# Patient Record
Sex: MALE | Race: WHITE | NOT HISPANIC OR LATINO | Employment: FULL TIME | URBAN - METROPOLITAN AREA
[De-identification: names, ages, dates, MRNs, and addresses within clinical notes are randomized per-mention and may not be internally consistent; named-entity substitution may affect disease eponyms.]

---

## 2022-12-01 DIAGNOSIS — G40.909 NONINTRACTABLE EPILEPSY WITHOUT STATUS EPILEPTICUS, UNSPECIFIED EPILEPSY TYPE: Primary | ICD-10-CM

## 2023-12-18 DIAGNOSIS — R56.9 SEIZURE: Primary | ICD-10-CM

## 2024-03-19 ENCOUNTER — PATIENT MESSAGE (OUTPATIENT)
Dept: NEUROLOGY | Facility: CLINIC | Age: 39
End: 2024-03-19
Payer: COMMERCIAL

## 2024-03-22 ENCOUNTER — OFFICE VISIT (OUTPATIENT)
Dept: NEUROLOGY | Facility: CLINIC | Age: 39
End: 2024-03-22
Payer: COMMERCIAL

## 2024-03-22 VITALS
DIASTOLIC BLOOD PRESSURE: 82 MMHG | SYSTOLIC BLOOD PRESSURE: 130 MMHG | HEIGHT: 68 IN | BODY MASS INDEX: 28.04 KG/M2 | WEIGHT: 185 LBS

## 2024-03-22 DIAGNOSIS — G40.019 LOCALIZATION-RELATED (FOCAL) (PARTIAL) IDIOPATHIC EPILEPSY AND EPILEPTIC SYNDROMES WITH SEIZURES OF LOCALIZED ONSET, INTRACTABLE, WITHOUT STATUS EPILEPTICUS: Primary | ICD-10-CM

## 2024-03-22 DIAGNOSIS — R56.9 SEIZURE: ICD-10-CM

## 2024-03-22 PROCEDURE — 99203 OFFICE O/P NEW LOW 30 MIN: CPT | Mod: S$GLB,,, | Performed by: SPECIALIST

## 2024-03-22 PROCEDURE — 1159F MED LIST DOCD IN RCRD: CPT | Mod: CPTII,S$GLB,, | Performed by: SPECIALIST

## 2024-03-22 PROCEDURE — 3075F SYST BP GE 130 - 139MM HG: CPT | Mod: CPTII,S$GLB,, | Performed by: SPECIALIST

## 2024-03-22 PROCEDURE — 99999 PR PBB SHADOW E&M-EST. PATIENT-LVL III: CPT | Mod: PBBFAC,,, | Performed by: SPECIALIST

## 2024-03-22 PROCEDURE — 3008F BODY MASS INDEX DOCD: CPT | Mod: CPTII,S$GLB,, | Performed by: SPECIALIST

## 2024-03-22 PROCEDURE — 3079F DIAST BP 80-89 MM HG: CPT | Mod: CPTII,S$GLB,, | Performed by: SPECIALIST

## 2024-03-22 RX ORDER — LEVETIRACETAM 500 MG/1
500 TABLET ORAL 3 TIMES DAILY
COMMUNITY
Start: 2024-01-12 | End: 2024-03-22 | Stop reason: SDUPTHER

## 2024-03-22 RX ORDER — LEVETIRACETAM 500 MG/1
1000 TABLET ORAL 2 TIMES DAILY
Qty: 360 TABLET | Refills: 3 | Status: SHIPPED | OUTPATIENT
Start: 2024-03-22

## 2024-03-22 NOTE — PROGRESS NOTES
"Subjective:      @Patient ID: Bart Thomas is a 38 y.o. male.    Chief Complaint: Seizures     HPI:            Pt ref by Leigh Ann Allison NP for neuro cons to eval seizure disorder/..    Former pt from approx 10yrs ago; pt reports last seizure was during sleep approx 2 yrs ago (witnessed by wife: fell out of bed, going to ED)/..    Seizure onset at age 16; currently on Keppra gen: tolerating w/o diff    See also 'facesheet' under media poss handwr notes     Review of Systems       [] Single     [x]     [] []   [x] Working     [] Retired, worked as:   [x] Drives     [] Does not drive   ----------------------------  Seizures  ..  Current Outpatient Medications   Medication Instructions    levETIRAcetam (KEPPRA) 500 mg, Oral, 3 times daily      Objective:      Exam:   Visit Vitals  /82   Ht 5' 8" (1.727 m)   Wt 83.9 kg (185 lb)   BMI 28.13 kg/m²     General Exam  If Accompanied, by__       body habitus_ Body mass index is 28.13 kg/m².  Gen exam     Neurological:  [x]Normal neuro exam            Labs:        Assessment/Plan:         ICD-10-CM ICD-9-CM   1. Localization-related (focal) (partial) idiopathic epilepsy and epileptic syndromes with seizures of localized onset, intractable, without status epilepticus  G40.019 345.51   2. Seizure  R56.9 780.39         Other Comments / Follow Up:            Medications Ordered This Encounter   Medications    levETIRAcetam (KEPPRA) 500 MG Tab     Sig: Take 2 tablets (1,000 mg total) by mouth 2 (two) times daily.     Dispense:  360 tablet     Refill:  3       No orders of the defined types were placed in this encounter.     Patient Instructions    Virtual visit in one year       MD NASIR HerculesA FAAN, [facility]  [facility] Medical Director   [facility]     "

## 2025-04-10 ENCOUNTER — OFFICE VISIT (OUTPATIENT)
Dept: NEUROLOGY | Facility: CLINIC | Age: 40
End: 2025-04-10
Payer: COMMERCIAL

## 2025-04-10 DIAGNOSIS — G40.019 LOCALIZATION-RELATED (FOCAL) (PARTIAL) IDIOPATHIC EPILEPSY AND EPILEPTIC SYNDROMES WITH SEIZURES OF LOCALIZED ONSET, INTRACTABLE, WITHOUT STATUS EPILEPTICUS: Primary | ICD-10-CM

## 2025-04-10 PROCEDURE — 98005 SYNCH AUDIO-VIDEO EST LOW 20: CPT | Mod: 95,,, | Performed by: SPECIALIST

## 2025-04-10 RX ORDER — LEVETIRACETAM 500 MG/1
1000 TABLET ORAL 2 TIMES DAILY
Qty: 360 TABLET | Refills: 3 | Status: SHIPPED | OUTPATIENT
Start: 2025-04-10

## 2025-04-10 NOTE — PROGRESS NOTES
This is a telemedicine note. See bottom of note for boilerplate elements.     Bart Thomas is a 39 y.o. male seen today via telemedicine visit.   Subjective:    Patient ID: Bart Thomas is a 39 y.o. male.  Chief Complaint: virtual follow up for dx or symptoms: sz    HPI:         no seizures   Has been years   Last sz 2022 I'd remarked in epic in the past     Current Outpatient Medications   Medication Instructions    levETIRAcetam (KEPPRA) 1,000 mg, Oral, 2 times daily        Objective:      Exam Limited due to telemedicine restrictions.  There were no vitals taken for this visit.  if accompanied, by:_ n  Speech: nl    Neuroimaging:      Assessment/Plan:     Problem List Items Addressed This Visit          Neuro    Localization-related (focal) (partial) idiopathic epilepsy and epileptic syndromes with seizures of localized onset, intractable, without status epilepticus - Primary       Other comments/ follow up:    No orders of the defined types were placed in this encounter.     Medications Ordered This Encounter   Medications    levETIRAcetam (KEPPRA) 500 MG Tab     Sig: Take 2 tablets (1,000 mg total) by mouth 2 (two) times daily.     Dispense:  360 tablet     Refill:  3     Aim virtual revisit one year / Vicki     Video Time Documentation:  Spent 3 minutes with patient over video discussing health concerns.   Total time this visit:   5   minutes    This is a telemedicine note.   Patient was treated using telemedicine, real time audio and video, according to [facility] protocols. This visit is not recorded.  The patient participated in the visit at a non-[facility] location selected by the patient, [facility].   I am licensed in [place] where the patient stated they are located.

## 2025-04-30 ENCOUNTER — TELEPHONE (OUTPATIENT)
Dept: NEUROLOGY | Facility: CLINIC | Age: 40
End: 2025-04-30
Payer: COMMERCIAL

## 2025-04-30 NOTE — TELEPHONE ENCOUNTER
Pt states he has no trouble falling asleep. He goes to bed abt 9-9 3/0 and sleeps until 2am then he is unable to go back to sleep until 4 am and has to get up at 5am for work. He  is asking if there is something he could take to help him stay asleep. Please advise Ph#  [phone]